# Patient Record
Sex: MALE | Race: WHITE | NOT HISPANIC OR LATINO | Employment: OTHER | URBAN - NONMETROPOLITAN AREA
[De-identification: names, ages, dates, MRNs, and addresses within clinical notes are randomized per-mention and may not be internally consistent; named-entity substitution may affect disease eponyms.]

---

## 2023-04-10 ENCOUNTER — HOSPITAL ENCOUNTER (EMERGENCY)
Facility: HOSPITAL | Age: 23
Discharge: HOME | End: 2023-04-10
Attending: EMERGENCY MEDICINE
Payer: MEDICAID

## 2023-04-10 VITALS
SYSTOLIC BLOOD PRESSURE: 155 MMHG | HEART RATE: 77 BPM | DIASTOLIC BLOOD PRESSURE: 87 MMHG | RESPIRATION RATE: 15 BRPM | BODY MASS INDEX: 28.11 KG/M2 | OXYGEN SATURATION: 99 % | TEMPERATURE: 98 F | WEIGHT: 219 LBS | HEIGHT: 74 IN

## 2023-04-10 DIAGNOSIS — N48.89 SEBACEOUS CYST OF PENIS: Primary | ICD-10-CM

## 2023-04-10 PROCEDURE — 99281 EMR DPT VST MAYX REQ PHY/QHP: CPT

## 2023-04-10 NOTE — ED PROVIDER NOTES
"EMERGENCY DEPARTMENT HISTORY AND PHYSICAL EXAM     This note is dictated on [employer] word recognition program.  There are word recognition mistakes and grammatical errors that are occasionally missed on review.     Date: 4/10/2023   Patient Name: Devon Grigsby       History of Presenting Illness      Chief Complaint   Patient presents with    Abscess     Patient reports cyst on penis x's 2 months. Pt reports that it "popped 2 weeks ago".         1730   Devon Grigsby is a 22 y.o. male with PMHX of no significant history who presents to the emergency department C/O penile lesion.    Patient reports painless cyst to penis.  States is been going on for several weeks now and comes and goes.  Started draining a couple weeks ago spontaneously and reoccurred recently.  Patient is sexually active with wife.    PCP: No primary care provider on file.        No current facility-administered medications for this encounter.     No current outpatient medications on file.           Past History     Past Medical History:   No past medical history on file.     Past Surgical History:   No past surgical history on file.     Family History:   No family history on file.     Social History:         Allergies:   Review of patient's allergies indicates:  No Known Allergies       Review of Systems   Review of Systems   See HPI for pertinent positives and negatives       Physical Exam     Vitals:    04/10/23 1653 04/10/23 1655   BP:  (!) 155/87   Patient Position:  Sitting   Pulse:  77   Resp:  15   Temp:  98.1 °F (36.7 °C)   TempSrc:  Oral   SpO2:  99%   Weight: 99.3 kg (219 lb)    Height: 6' 2" (1.88 m)       Physical Exam  Vitals and nursing note reviewed.   Constitutional:       General: He is not in acute distress.     Appearance: Normal appearance. He is well-developed. He is not ill-appearing.   HENT:      Head: Normocephalic and atraumatic.   Eyes:      Extraocular Movements: Extraocular movements intact.      Conjunctiva/sclera: " Conjunctivae normal.   Pulmonary:      Effort: Pulmonary effort is normal. No respiratory distress.   Genitourinary:     Comments: Small firm sebaceous cyst underside of shaft of penis  Musculoskeletal:         General: No deformity or signs of injury. Normal range of motion.      Cervical back: Normal range of motion. No rigidity.   Skin:     General: Skin is dry.      Coloration: Skin is not pale.      Findings: No rash.   Neurological:      General: No focal deficit present.      Mental Status: He is alert and oriented to person, place, and time.      Cranial Nerves: No cranial nerve deficit.      Motor: No weakness.      Coordination: Coordination normal.            Diagnostic Study Results      Labs -   No results found for this or any previous visit (from the past 12 hour(s)).     Radiologic Studies -    No orders to display        Medications given in the ED-   Medications - No data to display        Medical Decision Making    I am the first provider for this patient.     I reviewed the vital signs, available nursing notes, past medical history, past surgical history, family history and social history.     Vital Signs:  Reviewed the patient's vital signs.     Pulse Oximetry Analysis and Interpretation:    99% on Room Air, normal      External Test Results (Pertinent to encounter):    Records Reviewed: Nursing Notes    History Obtained By: Patient    Provider Notes: Devon Grgisby is a 22 y.o. male with cyst    Co-morbidities Considered: none    Differential Diagnosis: cyst      ED Course:    Patient's sebaceous cyst penis.  Discussed typical course.  Will refer to Dermatology for follow-up.  No evidence of infection or STI.         Problems Addressed:  cyst    Procedures:   Procedures       Diagnosis and Disposition     Critical Care:      DISCHARGE NOTE:       Devon Grigsby's  results have been reviewed with him.  He has been counseled regarding his diagnosis, treatment, and plan.  He verbally conveys  understanding and agreement of the signs, symptoms, diagnosis, treatment and prognosis and additionally agrees to follow up as discussed.  He also agrees with the care-plan and conveys that all of his questions have been answered.  I have also provided discharge instructions for him that include: educational information regarding their diagnosis and treatment, and list of reasons why they would want to return to the ED prior to their follow-up appointment, should his condition change. He has been provided with education for proper emergency department utilization.         CLINICAL IMPRESSION:         1. Sebaceous cyst of penis              PLAN:   1. Discharge Home  2.      Medication List      You have not been prescribed any medications.        3. [facility]  [address] [facility] [address]  [phone]    Schedule an appointment as soon as possible for a visit       [facility]  [address] [place] [id number] [phone]  Schedule an appointment as soon as possible for a visit   As needed       _______________________________     Please note that this dictation was completed with [employer], the computer voice recognition software.  Quite often unanticipated grammatical, syntax, homophones, and other interpretive errors are inadvertently transcribed by the computer software.  Please disregard these errors.  Please excuse any errors that have escaped final proofreading.             Samson Ortega MD  04/10/23 7582